# Patient Record
Sex: MALE | Race: WHITE | NOT HISPANIC OR LATINO | URBAN - METROPOLITAN AREA
[De-identification: names, ages, dates, MRNs, and addresses within clinical notes are randomized per-mention and may not be internally consistent; named-entity substitution may affect disease eponyms.]

---

## 2017-01-03 ENCOUNTER — COMMUNICATION - HEALTHEAST (OUTPATIENT)
Dept: FAMILY MEDICINE | Facility: CLINIC | Age: 52
End: 2017-01-03

## 2017-01-03 DIAGNOSIS — G47.00 INSOMNIA: ICD-10-CM

## 2017-01-05 ENCOUNTER — COMMUNICATION - HEALTHEAST (OUTPATIENT)
Dept: FAMILY MEDICINE | Facility: CLINIC | Age: 52
End: 2017-01-05

## 2017-01-05 DIAGNOSIS — G47.00 INSOMNIA: ICD-10-CM

## 2017-01-20 ENCOUNTER — OFFICE VISIT - HEALTHEAST (OUTPATIENT)
Dept: FAMILY MEDICINE | Facility: CLINIC | Age: 52
End: 2017-01-20

## 2017-01-20 DIAGNOSIS — K75.81 NONALCOHOLIC STEATOHEPATITIS: ICD-10-CM

## 2017-01-20 DIAGNOSIS — R73.01 IMPAIRED FASTING GLUCOSE: ICD-10-CM

## 2017-01-20 DIAGNOSIS — Z00.00 HEALTH CARE MAINTENANCE: ICD-10-CM

## 2017-01-20 DIAGNOSIS — E78.5 HYPERLIPIDEMIA: ICD-10-CM

## 2017-01-20 DIAGNOSIS — G43.909 MIGRAINE: ICD-10-CM

## 2017-01-20 DIAGNOSIS — G47.00 INSOMNIA: ICD-10-CM

## 2017-01-20 LAB
CHOLEST SERPL-MCNC: 182 MG/DL
FASTING STATUS PATIENT QL REPORTED: YES
HBA1C MFR BLD: 5.6 % (ref 3.5–6)
HDLC SERPL-MCNC: 38 MG/DL
LDLC SERPL CALC-MCNC: 125 MG/DL
LDLC SERPL CALC-MCNC: 126 MG/DL
PSA SERPL-MCNC: 0.5 NG/ML (ref 0–3.5)
TRIGL SERPL-MCNC: 90 MG/DL

## 2017-01-20 ASSESSMENT — MIFFLIN-ST. JEOR: SCORE: 1691.18

## 2017-03-02 ENCOUNTER — COMMUNICATION - HEALTHEAST (OUTPATIENT)
Dept: FAMILY MEDICINE | Facility: CLINIC | Age: 52
End: 2017-03-02

## 2017-03-02 DIAGNOSIS — G47.00 INSOMNIA: ICD-10-CM

## 2017-03-20 ENCOUNTER — COMMUNICATION - HEALTHEAST (OUTPATIENT)
Dept: LAB | Facility: CLINIC | Age: 52
End: 2017-03-20

## 2017-05-18 ENCOUNTER — COMMUNICATION - HEALTHEAST (OUTPATIENT)
Dept: FAMILY MEDICINE | Facility: CLINIC | Age: 52
End: 2017-05-18

## 2017-05-18 DIAGNOSIS — G47.00 INSOMNIA: ICD-10-CM

## 2017-08-17 ENCOUNTER — COMMUNICATION - HEALTHEAST (OUTPATIENT)
Dept: FAMILY MEDICINE | Facility: CLINIC | Age: 52
End: 2017-08-17

## 2017-08-17 DIAGNOSIS — G47.00 INSOMNIA: ICD-10-CM

## 2017-08-18 RX ORDER — ZOLPIDEM TARTRATE 10 MG/1
TABLET ORAL
Qty: 90 TABLET | Refills: 0 | Status: SHIPPED | OUTPATIENT
Start: 2017-08-18

## 2017-08-21 ENCOUNTER — RECORDS - HEALTHEAST (OUTPATIENT)
Dept: ADMINISTRATIVE | Facility: OTHER | Age: 52
End: 2017-08-21

## 2017-08-24 ENCOUNTER — RECORDS - HEALTHEAST (OUTPATIENT)
Dept: ADMINISTRATIVE | Facility: OTHER | Age: 52
End: 2017-08-24

## 2017-08-25 ENCOUNTER — COMMUNICATION - HEALTHEAST (OUTPATIENT)
Dept: FAMILY MEDICINE | Facility: CLINIC | Age: 52
End: 2017-08-25

## 2017-08-29 ENCOUNTER — RECORDS - HEALTHEAST (OUTPATIENT)
Dept: ADMINISTRATIVE | Facility: OTHER | Age: 52
End: 2017-08-29

## 2017-09-12 ENCOUNTER — RECORDS - HEALTHEAST (OUTPATIENT)
Dept: ADMINISTRATIVE | Facility: OTHER | Age: 52
End: 2017-09-12

## 2017-09-14 ENCOUNTER — OFFICE VISIT - HEALTHEAST (OUTPATIENT)
Dept: FAMILY MEDICINE | Facility: CLINIC | Age: 52
End: 2017-09-14

## 2017-09-14 DIAGNOSIS — G47.00 INSOMNIA: ICD-10-CM

## 2017-09-14 DIAGNOSIS — K75.81 NONALCOHOLIC STEATOHEPATITIS: ICD-10-CM

## 2017-09-14 DIAGNOSIS — G43.909 MIGRAINES: ICD-10-CM

## 2017-09-14 RX ORDER — ZOLPIDEM TARTRATE 5 MG/1
5 TABLET ORAL DAILY
Qty: 90 TABLET | Refills: 0 | Status: SHIPPED | OUTPATIENT
Start: 2017-09-14

## 2017-10-18 ENCOUNTER — COMMUNICATION - HEALTHEAST (OUTPATIENT)
Dept: FAMILY MEDICINE | Facility: CLINIC | Age: 52
End: 2017-10-18

## 2017-10-18 DIAGNOSIS — G43.909 MIGRAINES: ICD-10-CM

## 2018-01-30 ENCOUNTER — COMMUNICATION - HEALTHEAST (OUTPATIENT)
Dept: FAMILY MEDICINE | Facility: CLINIC | Age: 53
End: 2018-01-30

## 2018-08-20 ENCOUNTER — RECORDS - HEALTHEAST (OUTPATIENT)
Dept: ADMINISTRATIVE | Facility: OTHER | Age: 53
End: 2018-08-20

## 2018-08-21 ENCOUNTER — RECORDS - HEALTHEAST (OUTPATIENT)
Dept: ADMINISTRATIVE | Facility: OTHER | Age: 53
End: 2018-08-21

## 2018-08-30 ENCOUNTER — RECORDS - HEALTHEAST (OUTPATIENT)
Dept: ADMINISTRATIVE | Facility: OTHER | Age: 53
End: 2018-08-30

## 2018-08-31 ENCOUNTER — RECORDS - HEALTHEAST (OUTPATIENT)
Dept: ADMINISTRATIVE | Facility: OTHER | Age: 53
End: 2018-08-31

## 2021-05-30 VITALS — HEIGHT: 70 IN | BODY MASS INDEX: 26.82 KG/M2 | WEIGHT: 187.38 LBS

## 2021-05-31 VITALS — WEIGHT: 194.44 LBS | BODY MASS INDEX: 27.9 KG/M2

## 2021-06-01 ENCOUNTER — RECORDS - HEALTHEAST (OUTPATIENT)
Dept: ADMINISTRATIVE | Facility: CLINIC | Age: 56
End: 2021-06-01

## 2021-06-08 NOTE — PROGRESS NOTES
Assessment:        1. Health care maintenance  PSA (Prostatic-Specific Antigen), Annual Screen   2. Hyperlipidemia  Lipid Cascade FASTING    LDL Cholesterol, Direct   3. Impaired fasting glucose  Glucose    Glycosylated Hemoglobin A1c   4. Nonalcoholic steatohepatitis  Hepatic Profile   5. Insomnia     6. Migraine         Plan:      1. Health maintenance: PSA, lipids, glucose today. Up to date on recommend preventive cares. Continue with healthy lifestyle habits  2. Hyperlipidemia: Check lipid panel and direct LDL today  3. Impaired fasting glucose: Check A1c and glucose  4. ALFARO: hepatic profile today. Follow up with GI in August 5. Insomnia: Continue zolpidem prn  6. Migraines: Continue midrin prn        Subjective:      Filipe Ch is a 51 y.o. male who presents for an annual exam. The patient reports that there is not domestic violence in his life. Overall feels really well. Exercising regularly and eating healthy. Admits work is stressful. Travels frequently. Brother recently diagnosed with leukemia and not doing well. He is going to see if he can be bone marrow donor for brother. History of ALFARO remains stable. Sees GI once yearly. Labs twice yearly. Remains on zinc supplement. Migraines stable. Uses Midrin as needed. History of insomnia. Takes zolpidem almost every night. ROS otherwise negative. Updated family, social, medical history as needed.     Healthy Habits:   Regular Exercise: Yes  Sunscreen Use: Yes  Healthy Diet: Yes  Dental Visits Regularly: Yes  Seat Belt: Yes  Sexually active: Yes  Monthly Self Testicular Exams:  Yes  Hemoccults: No  Flex Sig: No  Colonoscopy: Yes  Lipid Profile: Yes  Glucose Screen: Yes  Prevention of Osteoporosis: Yes  Last Dexa: N/A  Guns at Home:  N/A      Immunization History   Administered Date(s) Administered     DT (pediatric) 09/29/2004     Hep A, historic 07/17/2008, 07/22/2009     Influenza U8x7-10, 12/23/2009     Influenza, inj, historic 11/02/2015     Tdap  2009     Immunization status: up to date and documented.      Current Outpatient Prescriptions   Medication Sig Dispense Refill     isometheptene-acetaminophen-dichloralphenazone (MIDRIN) -325 mg per capsule Take 1 capsule by mouth 4 (four) times a day as needed. 30 capsule 0     zinc gluconate 100 mg Tab Take 300 tablets by mouth daily. Total of 300 MG  daily       zolpidem (AMBIEN) 10 mg tablet TAKE 1 TABLET DAILY AT BEDTIME 90 tablet 0     No current facility-administered medications for this visit.      Past Medical History   Diagnosis Date     Cervicalgia      Degenerative cervical disc      Diverticulitis of colon      HLD (hyperlipidemia)      Insomnia      Migraine      Nonalcoholic steatohepatitis      Justyn's disease      Carrier     Past Surgical History   Procedure Laterality Date     Pr repair flexor tendon,hand,w/o graft,ea       Description: Hand Repair Flexor Tendon (Each);  Recorded: 2008;  Comments: severed 2nd and 3rd digit     Pr repair of nasal septum       Description: Septoplasty;  Recorded: 2008;     Review of patient's allergies indicates no known allergies.  Family History   Problem Relation Age of Onset     Cancer Mother      lung     Heart disease Father       in sleep     Crohn's disease Brother      COPD Brother      Cancer Brother      AML     Social History     Social History     Marital status:      Spouse name: N/A     Number of children: N/A     Years of education: N/A     Occupational History     Not on file.     Social History Main Topics     Smoking status: Never Smoker     Smokeless tobacco: Never Used     Alcohol use No     Drug use: No     Sexual activity: Not on file     Other Topics Concern     Not on file     Social History Narrative       Review of Systems  General:  Denies problem  Eyes: Denies problem  Ears/Nose/Throat: Denies problem  Cardiovascular: Denies problem  Respiratory:  Denies problem  Gastrointestinal:  Denies  "problem  Genitourinary: Denies problem  Musculoskeletal:  Denies problem  Skin: Denies problem  Neurologic: Denies problem  Psychiatric: Denies problem  Endocrine: Denies problem  Heme/Lymphatic: Denies problem   Allergic/Immunologic: Denies problem        Objective:     Vitals:    01/20/17 0834   BP: 110/70   Pulse: (!) 54   Temp: 96.6  F (35.9  C)   TempSrc: Tympanic   SpO2: 98%   Weight: 187 lb 6 oz (85 kg)   Height: 5' 10\" (1.778 m)     Body mass index is 26.89 kg/(m^2).    Physical  General Appearance: Alert, cooperative, no distress, appears stated age  Head: Normocephalic, without obvious abnormality, atraumatic  Eyes: PERRL, conjunctiva/corneas clear, EOM's intact  Ears: Normal TM's and external ear canals, both ears  Nose: Nares normal, septum midline,mucosa normal, no drainage  Throat: Lips, mucosa, and tongue normal; teeth and gums normal  Neck: Supple, symmetrical, trachea midline, no adenopathy;  thyroid: not enlarged, symmetric, no tenderness/mass/nodules; no carotid bruit or JVD  Back: Symmetric, no curvature, ROM normal, no CVA tenderness  Lungs: Clear to auscultation bilaterally, respirations unlabored  Heart: Regular rate and rhythm, S1 and S2 normal, no murmur, rub, or gallop,  Abdomen: Soft, non-tender, bowel sounds active all four quadrants,  no masses, no organomegaly  Genitourinary: Penis normal. Right testis is descended. Left testis is descended.   Musculoskeletal: Normal range of motion. No joint swelling or deformity.   Extremities: Extremities normal, atraumatic, no cyanosis or edema  Skin: Skin color, texture, turgor normal, no rashes or lesions  Lymph nodes: Cervical, supraclavicular, and axillary nodes normal  Neurologic: He is alert. He has normal reflexes.   Psychiatric: He has a normal mood and affect.              "

## 2021-06-13 NOTE — PROGRESS NOTES
Assessment/Plan:     1. Insomnia     2. Migraines  isometheptene-acetaminophen-dichloralphenazone (MIDRIN) -325 mg per capsule   3. Nonalcoholic steatohepatitis         Diagnoses and all orders for this visit:    Insomnia  -I am in agreement that it would be best for patient to discontinue use of Ambien for management of chronic insomnia.  Discussed that a slow taper would probably be best tolerated and would be the recommended course of action.  He is currently taking 10 mg daily.  Discussed that he can start by reducing the nightly dosage by 5 mg 1 night out of the week, then 2 nights out of the week, then 3 nights out of the week and so forth until he is taking 5 mg every night.  He can then gradually reduce to 2.5 mg every night or he can try going without the medication when I did a week and then gradually increase this by 1 day every week until he has completely discontinued use of the medication.  Discussed importance of sleep hygiene.  Should avoid TV, electronics while in bed and about an hour before bedtime.  Should go to bed and get up at the same time every day.  Discussed cutting back on caffeine and limiting caffeinated beverages to the morning only.  Discussed importance of regular exercise and healthy diet.  Discussed importance of cool, dark and comfortable sleep environment.  Provided prescription for zolpidem 5 mg tablets so he can start the taper as soon as possible.    Migraines  -     isometheptene-acetaminophen-dichloralphenazone (MIDRIN) -325 mg per capsule; Take 1 capsule by mouth 4 (four) times a day as needed.  Dispense: 30 capsule; Refill: 0    Nonalcoholic steatohepatitis  -Doing well overall.  Continues on daily zinc supplement.  Follows regularly with gastroenterology.  Reviewed recent labs and office notes from [facility].    Other orders  -     zolpidem (AMBIEN) 5 MG tablet; Take 1 tablet (5 mg total) by mouth daily.  Dispense: 90 tablet; Refill: 0  -     Influenza,  Seasonal,Quad Inj, 36+ MOS          Subjective:      Filipe Ch is a 52 y.o. male who comes in today to discuss tapering off of Ambien.  He has been on Ambien for several years.  Initially started taking it on an occasional basis to help him get a better night sleep.  Over time he started using this more frequently and is now using it on a daily basis.  Finds that if he does not take it he has significant difficulty falling asleep and then does not feel well rested the following day.  Recently he has become more concerned about the daily use of this medication and would like to get off of it.  He is not sure the best way to do so.  Is concerned about how he will feel with stopping use of the medication.  His wife accompanies him on the office visit today.  She has also been taking Ambien and stopped it completely about 2 weeks ago.  He reports that he is otherwise doing really well.  He does continue to follow with gastroenterology for nonalcoholic's to had a hepatitis.  Recent lab work was done which showed normal liver enzymes.  Slight elevation of copper levels compared to the past but overall not significantly elevated compared to what they were before he started chelation therapy several years ago.  Continues on a daily zinc supplement.  Would like to get off of all prescription medications.  Still has a few tablets of Midrin left at home for headaches but when he runs out of this he is not planning to request refill and will plan to manage his headaches with Tylenol asmuch possible.  He reports that he is planning to transfer his primary residence to [place] but will still return to [place] during the summer months.  His brother recently had an experimental therapy for AML through [facility] in [place] and is now in remission.  Patient has been tested for being a bone marrow donor for his brother and has been identified as a good match so he is likely going to, a donor for his brother within the next  few months.  We reviewed medications and allergies.  No other concerns or questions today.    Current Outpatient Prescriptions   Medication Sig Dispense Refill     isometheptene-acetaminophen-dichloralphenazone (MIDRIN) -325 mg per capsule Take 1 capsule by mouth 4 (four) times a day as needed. 30 capsule 0     zinc gluconate 100 mg Tab Take 300 tablets by mouth daily. Total of 300 MG  daily       zolpidem (AMBIEN) 10 mg tablet TAKE 1 TABLET BY MOUTH DAILY AT BEDTIME 90 tablet 0     zolpidem (AMBIEN) 5 MG tablet Take 1 tablet (5 mg total) by mouth daily. 90 tablet 0     No current facility-administered medications for this visit.        Past Medical History, Family History, and Social History reviewed.  Past Medical History:   Diagnosis Date     Cervicalgia      Degenerative cervical disc      Diverticulitis of colon      HLD (hyperlipidemia)      Insomnia      Migraine      Nonalcoholic steatohepatitis      Justyn's disease     Carrier     Past Surgical History:   Procedure Laterality Date     IA REPAIR FLEXOR TENDON,HAND,W/O GRAFT,EA      Description: Hand Repair Flexor Tendon (Each);  Recorded: 2008;  Comments: severed 2nd and 3rd digit     IA REPAIR OF NASAL SEPTUM      Description: Septoplasty;  Recorded: 2008;     Review of patient's allergies indicates no known allergies.  Family History   Problem Relation Age of Onset     Cancer Mother      lung     Heart disease Father       in sleep     Crohn's disease Brother      COPD Brother      Cancer Brother      AML     Social History     Social History     Marital status:      Spouse name: N/A     Number of children: N/A     Years of education: N/A     Occupational History     Not on file.     Social History Main Topics     Smoking status: Never Smoker     Smokeless tobacco: Never Used     Alcohol use No     Drug use: No     Sexual activity: Not on file     Other Topics Concern     Not on file     Social History Narrative         Review  of systems is as stated in HPI, and the remainder of the 10 system review is otherwise negative.    Objective:     Vitals:    09/14/17 0958   BP: 132/80   Patient Site: Right Arm   Patient Position: Sitting   Cuff Size: Adult Large   Pulse: 71   Temp: 98.7  F (37.1  C)   TempSrc: Tympanic   SpO2: 97%   Weight: 194 lb 7 oz (88.2 kg)    Body mass index is 27.9 kg/(m^2).      General appearance: alert, appears stated age and cooperative  Neurologic: Grossly normal  Psych: mood appropriate, affect normal      This note has been dictated using voice recognition software. Any grammatical or context distortions are unintentional and inherent to the the software.